# Patient Record
Sex: FEMALE | Race: WHITE | NOT HISPANIC OR LATINO
[De-identification: names, ages, dates, MRNs, and addresses within clinical notes are randomized per-mention and may not be internally consistent; named-entity substitution may affect disease eponyms.]

---

## 2022-07-15 ENCOUNTER — RESULT REVIEW (OUTPATIENT)
Age: 62
End: 2022-07-15

## 2022-07-18 PROBLEM — Z00.00 ENCOUNTER FOR PREVENTIVE HEALTH EXAMINATION: Status: ACTIVE | Noted: 2022-07-18

## 2022-07-19 ENCOUNTER — APPOINTMENT (OUTPATIENT)
Dept: NEUROLOGY | Facility: CLINIC | Age: 62
End: 2022-07-19

## 2022-07-19 VITALS
OXYGEN SATURATION: 98 % | HEART RATE: 76 BPM | DIASTOLIC BLOOD PRESSURE: 78 MMHG | HEIGHT: 63 IN | WEIGHT: 123 LBS | BODY MASS INDEX: 21.79 KG/M2 | TEMPERATURE: 98.7 F | SYSTOLIC BLOOD PRESSURE: 115 MMHG

## 2022-07-19 DIAGNOSIS — R29.2 ABNORMAL REFLEX: ICD-10-CM

## 2022-07-19 PROCEDURE — 99214 OFFICE O/P EST MOD 30 MIN: CPT

## 2022-07-19 RX ORDER — LIDOCAINE 5% 700 MG/1
5 PATCH TOPICAL
Qty: 30 | Refills: 5 | Status: ACTIVE | COMMUNITY
Start: 2022-07-19 | End: 1900-01-01

## 2022-07-19 RX ORDER — NAPROXEN 500 MG/1
500 TABLET ORAL
Qty: 10 | Refills: 0 | Status: ACTIVE | COMMUNITY
Start: 2022-04-29

## 2022-07-19 RX ORDER — CYCLOBENZAPRINE HYDROCHLORIDE 5 MG/1
5 TABLET, FILM COATED ORAL
Qty: 10 | Refills: 0 | Status: DISCONTINUED | COMMUNITY
Start: 2022-04-29

## 2022-07-19 RX ORDER — ACETAMINOPHEN 325 MG/1
TABLET, FILM COATED ORAL
Refills: 0 | Status: ACTIVE | COMMUNITY

## 2022-07-19 RX ORDER — OMEGA-3/DHA/EPA/FISH OIL 300-1000MG
CAPSULE ORAL
Refills: 0 | Status: ACTIVE | COMMUNITY

## 2022-07-19 RX ORDER — GABAPENTIN 300 MG/1
300 CAPSULE ORAL
Qty: 90 | Refills: 0 | Status: DISCONTINUED | COMMUNITY
Start: 2022-02-15

## 2022-07-25 DIAGNOSIS — H53.9 UNSPECIFIED VISUAL DISTURBANCE: ICD-10-CM

## 2022-07-25 PROBLEM — R29.2 HYPERREFLEXIA: Status: ACTIVE | Noted: 2022-07-25

## 2022-07-25 NOTE — DATA REVIEWED
[de-identified] : 7/15/22 CT angio head and neck\par \par IMPRESSION:\par No high-grade stenosis or occlusion.\par \par \par 7/15/22 CT head\par \par IMPRESSION:\par No hydrocephalus, midline shift, acute intracranial hemorrhage or demarcated territorial infarct.\par \par 7/15/22 CT cervical spine\par Impression: Straightening of the cervical spine with no evidence of acute fractures. Degenerative changes at C5/C6 and C6/C7.

## 2022-07-25 NOTE — PHYSICAL EXAM
[FreeTextEntry1] : Physical examination \par General: No acute distress, Awake, Alert.   \par  other: significant tenderness to palpation of upper cervical facets. cervicalgia \par limited ROM with lateral rotation. \par occipital notch tenderness.\par no temporal tenderness\par \par Mental status \par Awake, alert, and oriented to person, time and place, Normal attention span and concentration, Recent and remote memory intact, Language intact, Fund of knowledge intact.   \par Cranial Nerves \par II: VFF  \par III, IV, VI: PERRL, EOMI.   a few beats of nystagmus\par V: Facial sensation is normal B/L.   \par VII: Facial strength is normal B/L. \par \par \par VIII: Gross hearing is intact.   \par  \par IX, X: Palate is midline and elevates symmetrically.   \par XI: Trapezius normal strength.   \par XII: Tongue midline without atrophy or fasciculations. \par \par Motor exam  \par Muscle tone - no evidence of rigidity or resistance in all 4 extremities.  \par No atrophy or fasciculations \par Muscle Strength: arms and legs, proximal and distal flexors and extensors are normal \par \par No UE drift.\par \par Reflexes \par  \par Diffuse hyperreflexia.  \par \par Plantars right: withdraws\par Plantars left: withdraws \par  \par \par Coordination \par Finger to nose: Normal.  \par Heel to shin: Normal.   \par  \par \par Sensory \par Intact sensation to vibration and cold.\par  \par \par Gait \par Normal including heels, toes, and tandem gait.  \par  \par

## 2022-07-25 NOTE — HISTORY OF PRESENT ILLNESS
[FreeTextEntry1] : Bret Huitron is a 61 year old woman with a history of occipital neuralgia and headaches presenting for a hospital follow up.\par \par She reports overall the pain is better in the neck area than previously three days ago. She is going for PT but feels at times the PT worsens her neck pain.\par She endorses last Wednesday she started with right eye black dots and "squiggly snakes" in her visual field in the right eye with a headache afterwards. She reports the visual obscurations are constant. The headaches are right side, periorbital and temporal. The pain is described as aching but she could function with her daily activities. Denies nausea, vomiting, photophobia, or phonophobia. Ms. Huitron takes Tylenol with relief or Naproxen if needed. The headaches lasts a few hours and happen twice per week. She does not take more than three tablets of Tylenol weekly.  Denies pain with brushing her hair or chewing. Denies head trauma. Drinks 1-2 cups of coffee per day. \par Mother also gets headaches. \par She went to the ophthalmologist Dr. Queen and is seeing a retina specialist. \par \par Her biggest complaint is neck pain described as pressure, non radiating. She rates the pain 3 out of 10 and is in physical therapy for her neck and right lower back pain. She has a follow up with neurosurgery. She has not seen pain management. Ms. Huitron took Gabapentin previously for occipital neuralgia that made her sleepy. \par She reports the discomfort in her neck does disturb her sleep at times. She prefers not to take any muscle relaxer at thisthisthis \par Denies smoking and drinks one glass of alcohol a few times per week. \par \par The remaining neurological review of systems is negative. \par \par 7/15/22 HPI Dr. Benedict, Vanessa Shepherd NP. \par "Patient is a 60 y/o F with PMHx of Occipital Neuralgia who presents to ED today 2/2 changes in vision in her R eye since yesterday, neck pain that radiates up to occiput and "odd sensation" in R arm this morning upon awakening. Patient states she was diagnosed 2 years ago with Occipital Neuralgia by her PCP, at the time she had a posterior headache that radiated up the right side and a similar "odd sensation" in her R arm. She was prescribed Gabapentin, is supposed to be taking daily but she does not because it makes her sleepy. She also endorses chronic daily neck pain, and intermittent headaches but no history of migraines. Yesterday afternoon patient states her R eye suddenly became over blurry with black shapes/squiggly spiders in her vision, she also endorses flashes in this eye. She states the vision in her L eye was completely normal at the time. Overnight she started experiencing a headache that started in her neck and traveled up the back of her head to the R side. This morning when she woke up she had an odd sensation in her R arm which she states is hard to explain, at times states weakness, heaviness, pain and an odd sensation. Because of all these symptoms patient brought herself to ED. Stroke code was called on arrival, NIHSS 0, visual acuity is decreased in R eye but she is able to tell which finger is moving on exam. /81, , no drifts seen on exam. Patient otherwise denies any speech or language deficits, dysarthria, double vision, abnormal shaking, LOC, changes in balance or coordination, sensory deficits, drooping of her face, weakness in her legs, jaw pain, recent neck or back injuries, recent falls. She states that she does have pain around her R eye when she presses, when I press she denies any scalp tenderness. Denies history of migraines but states she gets headaches at least twice a week."

## 2022-07-25 NOTE — ASSESSMENT
[FreeTextEntry1] : Bret Huitron is a 61 year old woman with neck pain.\par \par CT head without significant findings. \par PT referral.\par Pain management referral.\par Lidocaine patch to neck area.\par Apply heat 20 minutes three times daily.\par Pt declined muscle relaxer at night.\par \par Hyperreflexia and neck pain- MRI cervical spine previously ordered not completed. Will reorder. Follow up with neurosurgery if needed for C5-C6 disc osteophytes flattening in the ventral spinal cord.\par \par \par Right eye visual obscurations- continue with ophthalmology follow up. \par MRI Brain to rule out any structural lesions.\par \par Follow up after above completed.

## 2022-08-01 ENCOUNTER — APPOINTMENT (OUTPATIENT)
Dept: NEUROSURGERY | Facility: CLINIC | Age: 62
End: 2022-08-01

## 2022-09-09 ENCOUNTER — RESULT REVIEW (OUTPATIENT)
Age: 62
End: 2022-09-09

## 2022-09-13 ENCOUNTER — NON-APPOINTMENT (OUTPATIENT)
Age: 62
End: 2022-09-13

## 2022-10-17 ENCOUNTER — APPOINTMENT (OUTPATIENT)
Dept: NEUROLOGY | Facility: CLINIC | Age: 62
End: 2022-10-17

## 2022-10-17 VITALS
TEMPERATURE: 98.7 F | OXYGEN SATURATION: 97 % | BODY MASS INDEX: 21.79 KG/M2 | HEIGHT: 63 IN | WEIGHT: 123 LBS | HEART RATE: 81 BPM | SYSTOLIC BLOOD PRESSURE: 121 MMHG | DIASTOLIC BLOOD PRESSURE: 82 MMHG

## 2022-10-17 DIAGNOSIS — M54.81 OCCIPITAL NEURALGIA: ICD-10-CM

## 2022-10-17 PROCEDURE — 99214 OFFICE O/P EST MOD 30 MIN: CPT

## 2022-10-17 NOTE — PHYSICAL EXAM
[FreeTextEntry1] : Physical examination \par General: No acute distress, Awake, Alert.   \par  other: significant tenderness to palpation of upper cervical facets. cervicalgia \par limited ROM with lateral rotation. \par right occipital notch tenderness.\par + cervical trigger points \par no temporal tenderness\par \par Mental status \par Awake, alert, and oriented to person, time and place, Normal attention span and concentration, Recent and remote memory intact, Language intact, Fund of knowledge intact.   \par Cranial Nerves \par II: VFF  \par III, IV, VI: PERRL, EOMI.   a few beats of nystagmus\par V: Facial sensation is normal B/L.   \par VII: Facial strength is normal B/L. \par \par \par VIII: Gross hearing is intact.   \par  \par IX, X: Palate is midline and elevates symmetrically.   \par XI: Trapezius normal strength.   \par XII: Tongue midline without atrophy or fasciculations. \par \par Motor exam  \par Muscle tone - no evidence of rigidity or resistance in all 4 extremities.  \par No atrophy or fasciculations \par Muscle Strength: arms and legs, proximal and distal flexors and extensors are normal \par \par No UE drift.\par \par Reflexes \par  \par Diffuse hyperreflexia.  \par \par Plantars right: withdraws\par Plantars left: withdraws \par  \par \par Coordination \par Finger to nose: Normal.  \par Heel to shin: Normal.   \par  \par \par Sensory \par Intact sensation to vibration and cold.\par  \par \par Gait \par Normal including heels, toes, and tandem gait.  \par  \par

## 2022-10-17 NOTE — DATA REVIEWED
[de-identified] : 9/9/22 MRI brain : no acute findings.  [de-identified] : 7/15/22 CT angio head and neck\par \par IMPRESSION:\par No high-grade stenosis or occlusion.\par \par \par 7/15/22 CT head\par \par IMPRESSION:\par No hydrocephalus, midline shift, acute intracranial hemorrhage or demarcated territorial infarct.\par \par 7/15/22 CT cervical spine\par Impression: Straightening of the cervical spine with no evidence of acute fractures. Degenerative changes at C5/C6 and C6/C7.

## 2022-10-17 NOTE — ASSESSMENT
[FreeTextEntry1] : Bret Huitron is a 61 year old woman with neck pain.\par \par refer to pain management for possible right occipital neuralgia  and cervicalgia \par no further neurolgic workup

## 2022-10-17 NOTE — HISTORY OF PRESENT ILLNESS
[FreeTextEntry1] : Patient presents for follow up for occipital pain. Patient states while on vacation she seems to do better. She feels she has aching pain over occipital region and neck when she works as a . She usually has to push and pull heavy things through out the day. She completed physical therapy in March 2022 and it helped a little. She feels the lidoderm patches help and she usually it at night after a long day of work. She takes tylenon 3-4 times in a week. \par \par 3 years ago she had severe occipital neuralgia and pain down her right arm \par Currently she has no radicular pain in her arms \par Neurontin helped her but it made her too sleepy so she stopped it.

## 2023-05-02 ENCOUNTER — APPOINTMENT (OUTPATIENT)
Dept: PAIN MANAGEMENT | Facility: CLINIC | Age: 63
End: 2023-05-02
Payer: MEDICAID

## 2023-05-02 ENCOUNTER — RESULT REVIEW (OUTPATIENT)
Age: 63
End: 2023-05-02

## 2023-05-02 VITALS
HEIGHT: 63 IN | DIASTOLIC BLOOD PRESSURE: 72 MMHG | BODY MASS INDEX: 21.79 KG/M2 | OXYGEN SATURATION: 98 % | SYSTOLIC BLOOD PRESSURE: 105 MMHG | WEIGHT: 123 LBS | HEART RATE: 88 BPM

## 2023-05-02 PROCEDURE — 99203 OFFICE O/P NEW LOW 30 MIN: CPT

## 2023-05-02 NOTE — DATA REVIEWED
[FreeTextEntry1] : MRI CERVICAL SPINE \par Order#: [id number] \par \par \par \par HISTORY: 62 years Female NECK PAIN MRI \par \par  PROCEDURE: MRI cervical spine without contrast \par \par  COMPARISON: None \par \par  TECHNIQUE: MRI cervical spine performed 1.5 Anayeli magnet \par \par  FINDINGS: \par  There is maintenance of the normal cervical lordosis. \par \par  The prevertebral soft tissues appear within range of normal. \par \par  The craniocervical junction and clivus and C1-C2 distance appear within range of normal \par \par  There is uniform marrow signal on all sequences \par \par  The cervical cord is uniform in signal intensity without evidence of syrinx or suggestion of \par myelomalacia. \par \par  At C2-C3 , there is no significant central or foraminal stenosis. \par \par  At C3-C4 there is no significant central or foraminal stenosis. \par \par  At C4-C5, there is no significant central or foraminal stenosis. \par \par  At C5-C6 , there is bilateral uncovertebral joint spurring and broad-based disc osteophyte complex \par effacing the ventral epidural space. The AP dimension of the thecal sac within the midline measures \par 8.8 mm. There is mild bilateral foraminal narrowing. \par \par  At C6-C7 , broad-based disc osteophyte complex contributes to effacement of the ventral epidural \par space. A superimposed small central disc protrusion is present. Mild to moderate bilateral foraminal\par stenosis is present. \par \par  At C7-T1 , there is disc desiccation. A small central disc protrusion is present. \par \par \par  IMPRESSION: \par  Multiple level degenerative disc disease most severe at C5-C6 and C6-C7 results in varying degrees \par of central foraminal stenosis as outlined above \par \par \par 4.9cm\par  Other findings discussed above \par \par \par  Thank you for allowing us to participate in the evaluation of this patient.

## 2023-05-02 NOTE — HISTORY OF PRESENT ILLNESS
[7] : 3. What number best describes how, during the past week, pain has interfered with your general activity? 7/10 pain [Neck Pain] : neck pain [___ yrs] : [unfilled] year(s) ago [Constant] : constant [6] : a minimum pain level of 6/10 [10] : a maximum pain level of 10/10 [Dull] : dull [Aching] : aching [Throbbing] : throbbing [Lifting] : lifting [Turning Head] : turning head [Looking Up] : looking up [Looking Down] : looking down [Medications] : medications [Ice] : ice [FreeTextEntry1] : [facility] [place] - Ms. HOA LOMAS is a 62 year F with PHx as below, referred by Dr Murphy presents today with chief complaint of neck pain. Reports that it developed one eric ago It is located cervical spine;  there is radiation of the pain into the right arm. The pain is presently 7/10 in severity on the numerical rating scale. It is sharp in nature. The pain is constant. There is. diurnal worsening, during the night The pain is aggravated with cervical The pain improves with rest. The pain is functionally and emotionally disabling for the patient as its preventing them from going about activities of daily living, such as routine housework. The pain does impair the patient’s ability to sleep. \par \par Patient attests to  6 weeks of provider directed treatment, including a provider directed stretching regimen, trialed Cyclobenaprine, and Naproxen. Patient reports treatment that occurred within the last 3 months\par Patient report that symptoms have been persistent and and progressing after treatment\par  \par Reports there is NO present numbness, there is NO weakness. Patient denies any bowel/bladder incontinence, no saddle/perineal anesthesia or any other red flag signs or symptoms. Reports regular BMs.\par   [FreeTextEntry2] : 21 [FreeTextEntry7] : Referred by Dr. Benedict of [facility].  Patient presents with neck and head pain radiating to hands. [FreeTextEntry4] : PT

## 2023-05-02 NOTE — REVIEW OF SYSTEMS
[Neck Pain] : neck pain [Radiating Pain] : radiating pain [Decreased ROM] : decreased range of motion [Weakness] : weakness [Negative] : Heme/Lymph

## 2023-05-02 NOTE — PHYSICAL EXAM
[de-identified] : General: Well-developed and well-nourished.  No acute distress.\par Psychiatric: Behavior was cooperative  \par Head:  Normocephalic and atraumatic\par Eyes:  Sclera white. Conjunctiva and eyelids pink and moist without discharge.\par Cardiovascular:  Regular\par Respiratory:  Trachea midline. Normal effort.\par No accessory muscle use with respiration\par Abdomen: Non distended, soft and nontender\par Skin:  No rashes, ulcers, or lesions appreciated.\par Neck: There is no pain with extension,     ROM limited in extension\par Extremities: No edema \par Musculoskeletal: Moving all extremities freely \par Neuro: CN 2-12 Grossly intact\par Sensation to light touch is intact in all extremities\par Gait: Ambulates with no assistive device.

## 2023-05-02 NOTE — CONSULT LETTER
[Dear  ___] : Dear  [unfilled], [Consult Letter:] : I had the pleasure of evaluating your patient, [unfilled]. [Please see my note below.] : Please see my note below. [Consult Closing:] : Thank you very much for allowing me to participate in the care of this patient.  If you have any questions, please do not hesitate to contact me. [Sincerely,] : Sincerely, [FreeTextEntry3] : Kimo Ferrera DO

## 2023-05-02 NOTE — ASSESSMENT
[FreeTextEntry1] : Ms. HOA LOMAS is a 62 year F suffering from neck pain, that based upon today's subjective complaints, physical examination, and MRI review, is likely cervical radiculopathy.\par \par >> Medications\par \par Chronic opioid use for non-malignant pain, in particular at high doses would not be recommended since it can potentially lead to hyperalgesia (hypersensitivity), tolerance and addiction. \par  \par gabapentin 100 mg po qhs\par \par Flexeril 5 mg po bid prn spasms\par  \par >> Interventions\par  \par None indicated at this time\par  \par >> Therapy and Other Modalities\par  \par diagnosis: cervical spondylosis\par  periscapular and scapulothoracic stretching and strengthening\par teach home exercise regimen\par massage and myofascial release\par increase ROM, strengthening, postural training, other modalities ad ramos\par physical therapy - 2x weekly / 6 weeks\par Precautions - universal safety, falls \par \par \par Continue with daily home stretching regimen, educated advised and demonstrated cervical paraspinal and levator scap stretches\par \par >> Imaging and Other Studies\par \par See Media \par \par >> Consults\par \par None ordered

## 2023-06-27 ENCOUNTER — APPOINTMENT (OUTPATIENT)
Dept: PAIN MANAGEMENT | Facility: CLINIC | Age: 63
End: 2023-06-27
Payer: MEDICAID

## 2023-06-27 VITALS
SYSTOLIC BLOOD PRESSURE: 103 MMHG | BODY MASS INDEX: 21.79 KG/M2 | WEIGHT: 123 LBS | DIASTOLIC BLOOD PRESSURE: 68 MMHG | HEIGHT: 63 IN

## 2023-06-27 DIAGNOSIS — M79.10 MYALGIA, UNSPECIFIED SITE: ICD-10-CM

## 2023-06-27 DIAGNOSIS — M54.12 RADICULOPATHY, CERVICAL REGION: ICD-10-CM

## 2023-06-27 DIAGNOSIS — M54.2 CERVICALGIA: ICD-10-CM

## 2023-06-27 PROCEDURE — 99213 OFFICE O/P EST LOW 20 MIN: CPT

## 2023-06-27 RX ORDER — CYCLOBENZAPRINE HYDROCHLORIDE 5 MG/1
5 TABLET, FILM COATED ORAL
Qty: 25 | Refills: 2 | Status: ACTIVE | COMMUNITY
Start: 2023-05-02 | End: 1900-01-01

## 2023-06-27 RX ORDER — GABAPENTIN 100 MG/1
100 CAPSULE ORAL
Qty: 90 | Refills: 2 | Status: ACTIVE | COMMUNITY
Start: 2023-05-02 | End: 1900-01-01

## 2023-06-27 NOTE — PHYSICAL EXAM
[de-identified] : General: AAOx3, NAD\par HEENT: EOMI, Sclera white\par CVS: +2 Pulses\par Pulmonary: No Respiratory Distress\par Abdomen: Soft, NT, ND\par MSK: Moving all extremities against gravity\par Neuro: Sensation I to LT\par Extremities: (-)Edema\par Derm: No Rash\par Psych: Calm, Cooperative\par \par

## 2023-06-27 NOTE — DATA REVIEWED
[FreeTextEntry1] : MRI CERVICAL SPINE \par Order#: MRI [id number] \par \par \par \par HISTORY: 62 years Female NECK PAIN MRI \par \par  PROCEDURE: MRI cervical spine without contrast \par \par  COMPARISON: None \par \par  TECHNIQUE: MRI cervical spine performed 1.5 Anayeli magnet \par \par  FINDINGS: \par  There is maintenance of the normal cervical lordosis. \par \par  The prevertebral soft tissues appear within range of normal. \par \par  The craniocervical junction and clivus and C1-C2 distance appear within range of normal \par \par  There is uniform marrow signal on all sequences \par \par  The cervical cord is uniform in signal intensity without evidence of syrinx or suggestion of \par myelomalacia. \par \par  At C2-C3 , there is no significant central or foraminal stenosis. \par \par  At C3-C4 there is no significant central or foraminal stenosis. \par \par  At C4-C5, there is no significant central or foraminal stenosis. \par \par  At C5-C6 , there is bilateral uncovertebral joint spurring and broad-based disc osteophyte complex \par effacing the ventral epidural space. The AP dimension of the thecal sac within the midline measures \par 8.8 mm. There is mild bilateral foraminal narrowing. \par \par  At C6-C7 , broad-based disc osteophyte complex contributes to effacement of the ventral epidural \par space. A superimposed small central disc protrusion is present. Mild to moderate bilateral foraminal\par stenosis is present. \par \par  At C7-T1 , there is disc desiccation. A small central disc protrusion is present. \par \par \par  IMPRESSION: \par  Multiple level degenerative disc disease most severe at C5-C6 and C6-C7 results in varying degrees \par of central foraminal stenosis as outlined above \par \par \par 4.9cm\par  Other findings discussed above \par \par \par  Thank you for allowing us to participate in the evaluation of this patient.

## 2023-06-27 NOTE — REVIEW OF SYSTEMS
[Neck Pain] : neck pain [Muscle Pain] : muscle pain [Joint Pain] : joint pain [Joint Stiffness] : joint stiffness

## 2023-06-27 NOTE — ASSESSMENT
[FreeTextEntry1] : Ms. HOA LOMAS is a 62 year F suffering from right sided neck pain, that based upon today's subjective complaints, physical examination, and MRI review, is likely cervical radiculopathy.\par \par >> Medications\par \par Chronic opioid use for non-malignant pain, in particular at high doses would not be recommended since it can potentially lead to hyperalgesia (hypersensitivity), tolerance and addiction. \par  \par Gabapentin 100 mg po qhs\par \par Flexeril 5 mg po bid prn spasms\par  \par >> Interventions\par  \par None indicated at this time\par  \par >> Therapy and Other Modalities\par  \par Continue with daily home stretching regimen, educated advised and demonstrated cervical paraspinal and levator scap stretches\par \par >> Imaging and Other Studies\par \par See Media \par \par >> Consults\par \par None ordered

## 2023-06-27 NOTE — HISTORY OF PRESENT ILLNESS
[FreeTextEntry1] : Interval Note:\par \par Since last visit the pain intensity has improved \par \par Recent course of physical therapy completed\par \par Medication has been allowing patient to go about activities of daily living with less pain.\par \par Moving bowels regularly. No recent falls. \par \par Patient denies any bowel/bladder incontinence, no saddle/perineal anesthesia or any other red flag signs or symptoms.\par \par ---\par \par HPI - Ms. HOA LOMAS is a 62 year F with PHx as below, referred by Dr Murphy presents today with chief complaint of neck pain. Reports that it developed one eric ago It is located cervical spine;  there is radiation of the pain into the right arm. The pain is presently 7/10 in severity on the numerical rating scale. It is sharp in nature. The pain is constant. There is. diurnal worsening, during the night The pain is aggravated with cervical The pain improves with rest. The pain is functionally and emotionally disabling for the patient as its preventing them from going about activities of daily living, such as routine housework. The pain does impair the patient’s ability to sleep. \par \par Patient attests to  6 weeks of provider directed treatment, including a provider directed stretching regimen, trialed Cyclobenaprine, and Naproxen. Patient reports treatment that occurred within the last 3 months\par Patient report that symptoms have been persistent and and progressing after treatment\par  \par Reports there is NO present numbness, there is NO weakness. Patient denies any bowel/bladder incontinence, no saddle/perineal anesthesia or any other red flag signs or symptoms. Reports regular BMs.\par

## 2023-10-03 ENCOUNTER — APPOINTMENT (OUTPATIENT)
Dept: PAIN MANAGEMENT | Facility: CLINIC | Age: 63
End: 2023-10-03

## 2024-05-07 ENCOUNTER — APPOINTMENT (OUTPATIENT)
Dept: PAIN MANAGEMENT | Facility: CLINIC | Age: 64
End: 2024-05-07